# Patient Record
Sex: FEMALE | Race: WHITE | URBAN - METROPOLITAN AREA
[De-identification: names, ages, dates, MRNs, and addresses within clinical notes are randomized per-mention and may not be internally consistent; named-entity substitution may affect disease eponyms.]

---

## 2020-07-23 ENCOUNTER — OFFICE VISIT (OUTPATIENT)
Dept: ORTHOPEDIC SURGERY | Age: 35
End: 2020-07-23
Payer: COMMERCIAL

## 2020-07-23 VITALS — WEIGHT: 280 LBS | HEIGHT: 68 IN | TEMPERATURE: 98 F | BODY MASS INDEX: 42.44 KG/M2

## 2020-07-23 PROCEDURE — 99203 OFFICE O/P NEW LOW 30 MIN: CPT | Performed by: ORTHOPAEDIC SURGERY

## 2020-07-23 ASSESSMENT — ENCOUNTER SYMPTOMS: BACK PAIN: 1

## 2020-07-23 NOTE — PROGRESS NOTES
[facility]  History and Physical  Knee Pain    Date:  2020    Name:  Amauri Canchola  Address:  [address] [place] [id number]    :  1985      Age:   27 y.o.    SSN:  [id number]      Medical Record Number:  <[id number]>      Chief Complaint    New Patient (left knee )      History of Present Illness:  Amauri Canchola is a 27 y.o. female who presents for evaluation of her left knee pain. Patient states that approximately 8 days ago she was going to attempt to go down a flight of stairs when her left foot slipped and inverted causing her to fall forward. She landed on the mid shin of her left lower extremity and began to roll and rotate down the stairs. She estimates that she fell down all 10 steps and noted immediate pain in the midfoot, mid shin, and lateral joint line of the left knee. Patient denies hitting her head or any LOC. She denies any neck or back pain. Patient was able to stand and ambulate under her own strength. She has been attempting conservative therapy for her pains and symptoms including: rest, ice, elevation, activity modification, and NSAIDs as needed. In addition, she has been seen by her chiropractor who attempted other modalities such as cold laser. Patient notes no improvement over the past 7 days and her symptoms. She denies any mechanical symptoms in her knee, ankle, or foot. She does not note any tenderness in the knee with weightbearing but does notice pain in the shin and midfoot with ambulating. She has no limitations to how long she can ambulate. Her knee pain and shin pain are worse with palpation. The patient denies any new injury since her fall. The patient denies any catching, giving way, joint locking, numbness, paresthesias, or weakness.       Pain Assessment  Location of Pain: Knee  Location Modifiers: Left  Severity of Pain: 6  Quality of Pain: Dull  Duration of Pain: A few days  Frequency of Pain: Constant  Aggravating Factors: Walking, Kneeling, Other (Comment)(flexing foot)  Relieving Factors: Ice, Rest, Nsaids  Result of Injury: Yes  Work-Related Injury: No  Are there other pain locations you wish to document?: No    No past medical history on file. No past surgical history on file. No family history on file. Social History     Socioeconomic History    Marital status:      Spouse name: None    Number of children: None    Years of education: None    Highest education level: None   Occupational History    None   Social Needs    Financial resource strain: None    Food insecurity     Worry: None     Inability: None    Transportation needs     Medical: None     Non-medical: None   Tobacco Use    Smoking status: Never Smoker    Smokeless tobacco: Never Used   Substance and Sexual Activity    Alcohol use: Yes    Drug use: Never    Sexual activity: None   Lifestyle    Physical activity     Days per week: None     Minutes per session: None    Stress: None   Relationships    Social connections     Talks on phone: None     Gets together: None     Attends [employer] service: None     Active member of club or organization: None     Attends meetings of clubs or organizations: None     Relationship status: None    Intimate partner violence     Fear of current or ex partner: None     Emotionally abused: None     Physically abused: None     Forced sexual activity: None   Other Topics Concern    None   Social History Narrative    None       No current outpatient medications on file. No current facility-administered medications for this visit. Allergies   Allergen Reactions    Penicillins          Review of Systems:  A 14 point review of systems was completed by the patient and is available in the media section of the scanned medical record and was reviewed on 7/23/2020.   The review is negative with the exception of those things mentioned in the HPI and Past Medical palpation over the area of ecchymosis and swelling. No palpable crepitus. No calf tenderness    Special tests: Negative squeeze test to the proximal, mid shaft, or distal tib-fib      Additional evaluation: Left foot/ankle  Inspection: Soft tissue swelling noted to the medial and lateral malleolus as well as the proximal aspect of the midfoot. Otherwise no effusion, ecchymosis, discoloration, erythema, excessive warmth. No gross deformities or signs or symptoms of infection. Tattoo noted over the dorsum of the left foot. Palpation: No tenderness to palpation to the medial lateral malleolus. No syndesmosis tenderness. No tenderness to palpation of the ATF, CF, PTF, or deltoid ligaments. Mild tenderness to palpation of the midshaft of the fourth metatarsal.    Range of motion: Patient exhibits full pain-free dorsiflexion, plantarflexion, inversion and eversion. Special tests: Negative squeeze test to the tarsals and metatarsals. Negative talar tilt. Negative anterior drawer    Neuro: Sensation intact and equal bilaterally      Radiology:     X-rays obtained and reviewed in office: AP and merchant view of both knees and a lateral of the left. Impression: No fractures, dislocations, visible tumors, or signs of acute trauma. Patient exhibits appropriate joint space in the medial and lateral femoral-tibial compartments bilaterally. Patient has approximately 50% decreased joint spacing in the patellofemoral joints bilaterally. Patella is riding appropriately in the trochlear groove with no subluxation or tilt noted. No loose bodies or foreign bodies. AP and lateral of the left tib-fib  Impression: No fractures, dislocations, visible tumors, or signs of acute trauma. Soft tissue swelling noted. No loose or foreign bodies noted. AP, lateral and mortise view of the left ankle  Impression: No fractures, dislocations, visible tumors, or signs of acute trauma. Notable soft tissue swelling. Appropriate joint spacing in the mortise. No loose or foreign bodies noted    AP, lateral, and oblique view of the left foot  Impression: No fractures, dislocations, visible tumors, or signs of acute trauma. Notable soft tissue swelling. No loose or foreign bodies noted. Office Procedures:  Orders Placed This Encounter   Procedures    XR TIBIA FIBULA LEFT (2 VIEWS)     Standing Status:   Future     Number of Occurrences:   1     Standing Expiration Date:   7/23/2021    XR FOOT LEFT (MIN 3 VIEWS)     Standing Status:   Future     Number of Occurrences:   1     Standing Expiration Date:   7/23/2021    XR ANKLE LEFT (MIN 3 VIEWS)     Standing Status:   Future     Number of Occurrences:   1     Standing Expiration Date:   7/23/2021    XR KNEE LEFT (3 VIEWS)     Standing Status:   Future     Number of Occurrences:   1     Standing Expiration Date:   7/23/2021            Assessment: Patient is a 27 y.o. female presenting to the office for evaluation of her left knee, left shin, left ankle, and left foot pain 8 days after a mechanical fall at home. Patient has a diagnosis of multiple contusions. Visit Diagnoses       Codes    Contusion of multiple sites of left lower extremity, initial encounter    -  Primary S80.12XA    Patellofemoral arthritis of left knee     M17.12    Patellofemoral arthritis of right knee     M17.11    Left leg pain     M79.605            Medical decision making:  Patient's workup and evaluation were reviewed with the patient in the office today. Patient's x-ray imaging was thoroughly reviewed with her in the office today. Given the patient's history and physical exam the patient has suffered a numerous contusions to the left lower extremity. She does have full range of motion in the left knee, foot and ankle. She denies any mechanical symptoms. She also does not have any signs or symptoms of compartment syndrome.   She was advised to take an 81 mg aspirin for the next 7 days to help decrease the risk of blood clots. Otherwise, the patient was educated on conservative therapy for her condition as detailed in the treatment plan below. She was heavily advised on elevation and compression. She does have decreased joint space in the patellofemoral joint and she is currently working on decreasing her weight as well as other conservative measures to offset the risk of developing further arthritic changes in the patellofemoral joint. Patient was encouraged to call the office if she has any other questions or concerns. All the patient's questions were answered while in the clinic. The patient is understanding of all instructions and agrees with the plan. Patient does not smoke and did not require smoking cessation patient education. Treatment Plan:    1. Take an 81 mg aspirin for the next 7 days to decrease risk of DVT  2. Wear well fitting shoes  3. Activity modification  4. Ice 20 minutes ever 1-2 hours PRN  5. NSAIDs as needed  6. Rest   7. Elevation at least 2 inches above the heart with pillows supporting the joint underneath  8. Lightweight exercise/low impact exercise  9. Appropriate diet/weight management      Follow up: PRN              Marissa Degroot PA-C    Physician Assistant - Certified  [facility] and [address] [place] [address]    07/23/20 3:15 PM    During this examination, I, [employer], [place], functioned as a scribe for Dr. Duncan Myles. This dictation was performed with a verbal recognition program (DRAGON) and it was checked for errors. It is possible that there are still dictated errors within this office note. If so, please bring any errors to my attention for an addendum. All efforts were made to ensure that this office note is accurate. This dictation was performed with a verbal recognition program (DRAGON) and it was checked for errors. It is possible that there are still dictated errors within this office note.   If so, please bring any errors to my attention for an addendum. All efforts were made to ensure that this office note is accurate. Supervising Physician Attestation:  I, Dr. Mirna Taylor, personally performed the services described in this documentation as scribed above, and it is both accurate and complete and I agree with all pertinent clinical information. I personally interviewed the patient and performed a physical examination and medical decision making. I discussed the patient's condition and treatment options and have  reviewed and agree with the past medical, family and social history unless otherwise noted. All of the patient's questions were answered.       Board Certified Orthopaedic Surgeon  [facility] and [address] and [address] and [employer]  Professor of [address]

## 2022-12-22 ENCOUNTER — OFFICE VISIT (OUTPATIENT)
Dept: ORTHOPEDIC SURGERY | Age: 37
End: 2022-12-22

## 2022-12-22 VITALS — BODY MASS INDEX: 42.44 KG/M2 | HEIGHT: 68 IN | WEIGHT: 280 LBS

## 2022-12-22 DIAGNOSIS — M17.0 LOCALIZED OSTEOARTHRITIS OF BOTH KNEES: Primary | ICD-10-CM

## 2022-12-22 DIAGNOSIS — M25.561 PAIN IN BOTH KNEES, UNSPECIFIED CHRONICITY: ICD-10-CM
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
## 2022-12-22 DIAGNOSIS — M25.562 PAIN IN BOTH KNEES, UNSPECIFIED CHRONICITY: ICD-10-CM

## 2022-12-22 RX ORDER — METHYLPREDNISOLONE ACETATE 40 MG/ML
40 INJECTION, SUSPENSION INTRA-ARTICULAR; INTRALESIONAL; INTRAMUSCULAR; SOFT TISSUE ONCE
Status: COMPLETED | OUTPATIENT
Start: 2022-12-22 | End: 2022-12-22

## 2022-12-22 RX ADMIN — METHYLPREDNISOLONE ACETATE 40 MG: 40 INJECTION, SUSPENSION INTRA-ARTICULAR; INTRALESIONAL; INTRAMUSCULAR; SOFT TISSUE at 15:22

## 2022-12-30 NOTE — PROGRESS NOTES
needed. In addition, she has been seen by her chiropractor who attempted other modalities such as cold laser. Patient notes no improvement over the past 7 days and her symptoms. She denies any mechanical symptoms in her knee, ankle, or foot. She does not note any tenderness in the knee with weightbearing but does notice pain in the shin and midfoot with ambulating. She has no limitations to how long she can ambulate. Her knee pain and shin pain are worse with palpation. The patient denies any new injury since her fall. The patient denies any catching, giving way, joint locking, numbness, paresthesias, or weakness. Pain Assessment  Location of Pain: Knee  Location Modifiers: Left, Right  Severity of Pain: 6  Duration of Pain: A few days  Frequency of Pain: Intermittent  Aggravating Factors: Stairs  Limiting Behavior: Some  Result of Injury: No  Work-Related Injury: No  Are there other pain locations you wish to document?: No    No past medical history on file. No past surgical history on file. No family history on file. Social History     Socioeconomic History    Marital status:      Spouse name: None    Number of children: None    Years of education: None    Highest education level: None   Tobacco Use    Smoking status: Never    Smokeless tobacco: Never   Substance and Sexual Activity    Alcohol use: Yes    Drug use: Never       No current outpatient medications on file. No current facility-administered medications for this visit. Allergies   Allergen Reactions    Penicillins          Review of Systems:  A 14 point review of systems was completed by the patient and is available in the media section of the scanned medical record and was reviewed.         Vital Signs:   Ht 5' 8\" (1.727 m)   Wt 280 lb (127 kg)   BMI 42.57 kg/m²     General Exam:    General: Jarrod Moore is a healthy and well appearing 44y.o. year old female who is sitting comfortably in our office in no acute distress. Neuro: Alert & Oriented x 3,  normal,  no focal deficits noted. Normal mood & affect. Eyes: Sclera clear  Ears: Normal external ear  Mouth: No oral lesions observed  Pulm: Respirations unlabored and regular   Skin: Warm, well perfused      Knee Examination: Right    Inspection:  No effusion, ecchymosis, discoloration, erythema, excessive warmth. no gross deformities, no signs of infection. Palpation: There is patellofemoral crepitus, there is no joint line tenderness. No other osseous or soft tissue tenderness. Negative calf tenderness    Range of Motion:  0-130 degrees without pain or difficulty. Appropriate patellar mobility. Strength: Grossly intact    Special Tests:  No ligament instability, valgus and varus stress test are stable at 0 and 30°. Lachman's exhibits a solid endpoint. Negative posterior drawer. Negative Homans sign. Negative Skip's. Gait: Non antalgic, without the use of assistive devices    Alignment:  Varus deformity    Neuro: Sensation equal bilateral lower extremities    Vascular: 2+ posterior tibialis pulse      Contralateral Knee Comparison Examination: Left    Inspection:  No effusion, ecchymosis, discoloration, erythema, excessive warmth. no gross deformities, no signs of infection. Palpation: There is patellofemoral crepitus, there is no joint line tenderness. No other osseous or soft tissue tenderness. Negative calf tenderness    Range of Motion:  0-130 degrees without pain or difficulty. Appropriate patellar mobility. Strength: Grossly intact    Special Tests:  No ligament instability, valgus and varus stress test are stable at 0 and 30°. Lachman's exhibits a solid endpoint. Negative posterior drawer. Negative Homans sign. Negative Skip's.     Gait: Non antalgic, without the use of assistive devices    Alignment:  Varus deformity    Neuro: Sensation equal bilateral lower extremities    Vascular: 2+ posterior tibialis pulse        Radiology:   Previously obtain imaging reviewed. X-rays obtained and reviewed in office: AP, lateral, and sunrise view of both knees. Impression: No fractures, dislocations, visible tumors, or signs of acute trauma. The medial and lateral femoral-tibial compartments exhibit decreased joint space bilaterally. There's decreased joint spacing in the patellofemoral joints bilaterally. Office Procedures:  Orders Placed This Encounter   Procedures    XR KNEE LEFT (3 VIEWS)     Standing Status:   Future     Number of Occurrences:   1     Standing Expiration Date:   12/22/2023    XR KNEE RIGHT (3 VIEWS)     Standing Status:   Future     Number of Occurrences:   1     Standing Expiration Date:   12/22/2023    MA ARTHROCENTESIS ASPIR&/INJ MAJOR JT/BURSA W/O US          After informed consent was provided, the patient was seated on the exam table with the right knee flexed to 90 degrees. The anterolateral aspect of the knee adjacent to the joint line was prepped with Chlora-prep. The skin and subcutaneous tissues were anesthetized with ethyl chloride spray. A 22-gauge needle was inserted into the right knee and 1 ml of 40 mg/ml DepoMedrol was injected. The needle was withdrawn and the puncture site sealed with a Band-Aid. The patient tolerated the procedure well. Post injection instructions and precautions given and any problems to notify us. After informed consent was provided, the patient was seated on the exam table with the left knee flexed to 90 degrees. The anterolateral aspect of the knee adjacent to the joint line was prepped with Chlora-prep. The skin and subcutaneous tissues were anesthetized with ethyl chloride spray. A 22-gauge needle was inserted into the left knee and 1 ml of 40 mg/ml DepoMedrol was injected. The needle was withdrawn and the puncture site sealed with a Band-Aid. The patient tolerated the procedure well.  Post injection instructions and precautions given and any problems to notify us. Assessment: This patient is a 44 y.o. female presenting to the office for an evaluation of her new onset bilateral knee pain. This patient has a diagnosis of overuse and patellofemoral arthritis. Encounter Diagnoses   Name Primary? Localized osteoarthritis of both knees Yes    Overuse injury     Left knee pain, unspecified chronicity     Pain in both knees, unspecified chronicity            Medical decision making:  Patient's workup and evaluation were reviewed with the patient in the office today. Imaging was reviewed with the patient. The patient was educated on conservative treatment for her condition as detailed below. She was also educated on postinjection precautions. The patient was also instructed on avoiding lunges, squats and other activities that can damage the patellofemoral joint. All the patient's questions were answered while in the clinic. The patient is understanding of all instructions and agrees with the plan. Treatment Plan:    Observe postinjection precautions  Activity modification/Rest   Ice 20 minutes ever 1-2 hours PRN  Take medications as prescribed/instructed  Elevation   Lightweight exercise/low impact exercise  Appropriate diet/weight management      Follow up: As needed          This patient exhibits moderate complexity for obtaining an independent history, reviewing past medical records, independent interpretation of diagnostic imaging, and further coordinating care. The patient exhibits low risk given that the patient is being treated conservatively at this time. Alicia Myers PA-C    Physician Assistant - Certified  Orthopedic [address]    12/30/22 4:14 PM          This dictation was performed with a verbal recognition program [facility]) and it was checked for errors. It is possible that there are still dictated errors within this office note.   If so, please bring any errors to my attention for an addendum. All efforts were made to ensure that this office note is accurate.

## 2024-10-01 ENCOUNTER — OFFICE VISIT (OUTPATIENT)
Dept: ORTHOPEDIC SURGERY | Age: 39
End: 2024-10-01
Payer: COMMERCIAL

## 2024-10-01 VITALS — BODY MASS INDEX: 42.44 KG/M2 | WEIGHT: 280 LBS | HEIGHT: 68 IN

## 2024-10-01 DIAGNOSIS — M25.562 PAIN IN BOTH KNEES, UNSPECIFIED CHRONICITY: ICD-10-CM

## 2024-10-01 DIAGNOSIS — M70.42 PREPATELLAR BURSITIS OF LEFT KNEE: Primary | ICD-10-CM

## 2024-10-01 DIAGNOSIS — M25.561 PAIN IN BOTH KNEES, UNSPECIFIED CHRONICITY: ICD-10-CM

## 2024-10-01 PROCEDURE — 99213 OFFICE O/P EST LOW 20 MIN: CPT | Performed by: PHYSICIAN ASSISTANT

## 2024-10-03 ENCOUNTER — OFFICE VISIT (OUTPATIENT)
Dept: ORTHOPEDIC SURGERY | Age: 39
End: 2024-10-03

## 2024-10-03 VITALS — HEIGHT: 68 IN | BODY MASS INDEX: 42.44 KG/M2 | WEIGHT: 280 LBS

## 2024-10-03 DIAGNOSIS — T14.8XXA MOREL LAVALLEE LESION: ICD-10-CM

## 2024-10-03 DIAGNOSIS — T14.8XXA TRAUMATIC INJURY TO SKIN OR SUBCUTANEOUS TISSUE: Primary | ICD-10-CM

## 2024-10-03 SDOH — HEALTH STABILITY: PHYSICAL HEALTH: ON AVERAGE, HOW MANY DAYS PER WEEK DO YOU ENGAGE IN MODERATE TO STRENUOUS EXERCISE (LIKE A BRISK WALK)?: 5 DAYS

## 2024-10-03 SDOH — HEALTH STABILITY: PHYSICAL HEALTH: ON AVERAGE, HOW MANY MINUTES DO YOU ENGAGE IN EXERCISE AT THIS LEVEL?: 40 MIN

## 2024-10-03 NOTE — PROGRESS NOTES
Chief Complaint:   Chief Complaint   Patient presents with    Knee Pain     Left Knee - 2 previous falls on her left leg. Her knee has hurt ever since. She has swelling off and on. Previous steroid injections. The last one was about 2 years ago. Stabbing pain when squatting and using stairs.          History of Present Illness:       Patient is a 39 y.o. female presents with the above complaint.  The patient is seen in consultation at the request of Willi ANGULO for diagnostic ultrasound fusion of the left knee clinical concerns for possible prepatellar bursitis of hoffitis.    Pain localizes to the lateral infra patella region.  The patient denies subjective instability about the knee and denies new onset weakness of the lower extremity.    Pain level 5 sharp in quality especially with squatting and/or navigating stairs    The patient denies a pattern of activity related swelling.      Treatment to date: Local measures.     There is prior history of blunt knee trauma. Workup has included x-rays    There is no prior history of autoimmune disease, inflammatory arthropathy, or crystal arthropathy.            Past Medical History:      No past medical history on file.    No past surgical history on file.      Present Medications:         Current Outpatient Medications   Medication Sig Dispense Refill    diclofenac sodium (VOLTAREN) 1 % GEL Apply 2 to 4 g 3 times daily for 2 weeks then twice daily as needed thereafter 150 g 3     No current facility-administered medications for this visit.         Allergies:        Allergies   Allergen Reactions    Latex Hives and Rash    Acetaminophen Other (See Comments)     Sweaty,rapid heart     Sweaty,rapid heart    Penicillins Hives and Rash     child        Social History:         Social History     Socioeconomic History    Marital status:      Spouse name: Not on file    Number of children: Not on file    Years of education: Not on file    Highest education level:

## 2024-10-07 NOTE — PROGRESS NOTES
Allergen Reactions    Latex Hives and Rash    Acetaminophen Other (See Comments)     Sweaty,rapid heart     Sweaty,rapid heart    Penicillins Hives and Rash     child         Review of Systems:  A 14 point review of systems was completed by the patient and is available in the media section of the scanned medical record and was reviewed.        General Exam:    General: Deidre Kasper is a 39 y.o. year old female who is sitting comfortably in our office in no acute distress.   Neuro: Alert & Oriented x 3,  normal,  no focal deficits noted. Normal mood & affect.  Eyes: Sclera clear  Ears: Normal external ear  Pulm: Respirations unlabored and regular   Skin: Warm, well perfused      Knee Examination: Left    Inspection:  No effusion, ecchymosis, discoloration, erythema, excessive warmth. no gross deformities, no signs of infection.     Palpation: There is patellofemoral crepitus, there is no joint line tenderness.  Mild tenderness over the prepatellar bursa as well as the lateral aspect of the infrapatellar fat pad.  No other osseous or soft tissue tenderness.  Negative calf tenderness    Range of Motion:  0-130 degrees without pain or difficulty. Appropriate patellar mobility.     Strength: Grossly intact     Special Tests:  Valgus and varus stress test are stable at 0 and 30°.   Negative anterior and posterior drawer.  Negative Homans sign.  Negative Skip's.     Gait: Non antalgic, without the use of assistive devices     Alignment:  Varus deformity     Neuro: Sensation equal bilateral lower extremities      Radiology:   Previously obtain imaging reviewed.    X-rays obtained and reviewed in office: AP, lateral, merchant view of both knees.  Impression: No fractures, dislocations, visible tumors, or signs of acute trauma.  The medial and lateral femoral-tibial compartments exhibit decreased joint space bilaterally. There's decreased joint spacing in the patellofemoral joints bilaterally. KL grade 2. Patella